# Patient Record
Sex: FEMALE | Race: WHITE
[De-identification: names, ages, dates, MRNs, and addresses within clinical notes are randomized per-mention and may not be internally consistent; named-entity substitution may affect disease eponyms.]

---

## 2017-09-15 ENCOUNTER — HOSPITAL ENCOUNTER (EMERGENCY)
Dept: HOSPITAL 62 - ER | Age: 32
Discharge: HOME | End: 2017-09-15
Payer: MEDICAID

## 2017-09-15 VITALS — DIASTOLIC BLOOD PRESSURE: 72 MMHG | SYSTOLIC BLOOD PRESSURE: 104 MMHG

## 2017-09-15 DIAGNOSIS — N30.00: Primary | ICD-10-CM

## 2017-09-15 DIAGNOSIS — R39.198: ICD-10-CM

## 2017-09-15 DIAGNOSIS — R07.9: ICD-10-CM

## 2017-09-15 DIAGNOSIS — F41.9: ICD-10-CM

## 2017-09-15 LAB
ALBUMIN SERPL-MCNC: 4.8 G/DL (ref 3.5–5)
ALP SERPL-CCNC: 53 U/L (ref 38–126)
ALT SERPL-CCNC: 24 U/L (ref 9–52)
ANION GAP SERPL CALC-SCNC: 13 MMOL/L (ref 5–19)
APPEARANCE UR: (no result)
AST SERPL-CCNC: 20 U/L (ref 14–36)
BASOPHILS # BLD AUTO: 0.1 10^3/UL (ref 0–0.2)
BASOPHILS NFR BLD AUTO: 1.1 % (ref 0–2)
BILIRUB DIRECT SERPL-MCNC: 0.4 MG/DL (ref 0–0.4)
BILIRUB SERPL-MCNC: 0.7 MG/DL (ref 0.2–1.3)
BILIRUB UR QL STRIP: NEGATIVE
BUN SERPL-MCNC: 11 MG/DL (ref 7–20)
CALCIUM: 9.6 MG/DL (ref 8.4–10.2)
CHLORIDE SERPL-SCNC: 105 MMOL/L (ref 98–107)
CK SERPL-CCNC: 204 U/L (ref 30–135)
CO2 SERPL-SCNC: 23 MMOL/L (ref 22–30)
CREAT SERPL-MCNC: 0.7 MG/DL (ref 0.52–1.25)
EOSINOPHIL # BLD AUTO: 0.3 10^3/UL (ref 0–0.6)
EOSINOPHIL NFR BLD AUTO: 3.8 % (ref 0–6)
ERYTHROCYTE [DISTWIDTH] IN BLOOD BY AUTOMATED COUNT: 13.2 % (ref 11.5–14)
GLUCOSE SERPL-MCNC: 87 MG/DL (ref 75–110)
GLUCOSE UR STRIP-MCNC: NEGATIVE MG/DL
HCT VFR BLD CALC: 39.2 % (ref 36–47)
HGB BLD-MCNC: 13.4 G/DL (ref 12–15.5)
HGB HCT DIFFERENCE: 1
KETONES UR STRIP-MCNC: NEGATIVE MG/DL
LIPASE SERPL-CCNC: 76.2 U/L (ref 23–300)
LYMPHOCYTES # BLD AUTO: 1.9 10^3/UL (ref 0.5–4.7)
LYMPHOCYTES NFR BLD AUTO: 27.6 % (ref 13–45)
MCH RBC QN AUTO: 29.4 PG (ref 27–33.4)
MCHC RBC AUTO-ENTMCNC: 34.3 G/DL (ref 32–36)
MCV RBC AUTO: 86 FL (ref 80–97)
MONOCYTES # BLD AUTO: 0.6 10^3/UL (ref 0.1–1.4)
MONOCYTES NFR BLD AUTO: 9.2 % (ref 3–13)
NEUTROPHILS # BLD AUTO: 4 10^3/UL (ref 1.7–8.2)
NEUTS SEG NFR BLD AUTO: 58.3 % (ref 42–78)
NITRITE UR QL STRIP: POSITIVE
PH UR STRIP: 7 [PH] (ref 5–9)
POTASSIUM SERPL-SCNC: 4.1 MMOL/L (ref 3.6–5)
PROT SERPL-MCNC: 7.9 G/DL (ref 6.3–8.2)
PROT UR STRIP-MCNC: NEGATIVE MG/DL
RBC # BLD AUTO: 4.57 10^6/UL (ref 3.72–5.28)
SODIUM SERPL-SCNC: 141 MMOL/L (ref 137–145)
SP GR UR STRIP: 1.02
UROBILINOGEN UR-MCNC: 2 MG/DL (ref ?–2)
WBC # BLD AUTO: 6.9 10^3/UL (ref 4–10.5)

## 2017-09-15 PROCEDURE — 80053 COMPREHEN METABOLIC PANEL: CPT

## 2017-09-15 PROCEDURE — 71020: CPT

## 2017-09-15 PROCEDURE — 99284 EMERGENCY DEPT VISIT MOD MDM: CPT

## 2017-09-15 PROCEDURE — 87086 URINE CULTURE/COLONY COUNT: CPT

## 2017-09-15 PROCEDURE — 84703 CHORIONIC GONADOTROPIN ASSAY: CPT

## 2017-09-15 PROCEDURE — 85025 COMPLETE CBC W/AUTO DIFF WBC: CPT

## 2017-09-15 PROCEDURE — 93010 ELECTROCARDIOGRAM REPORT: CPT

## 2017-09-15 PROCEDURE — 82553 CREATINE MB FRACTION: CPT

## 2017-09-15 PROCEDURE — 87088 URINE BACTERIA CULTURE: CPT

## 2017-09-15 PROCEDURE — 82550 ASSAY OF CK (CPK): CPT

## 2017-09-15 PROCEDURE — 81001 URINALYSIS AUTO W/SCOPE: CPT

## 2017-09-15 PROCEDURE — 36415 COLL VENOUS BLD VENIPUNCTURE: CPT

## 2017-09-15 PROCEDURE — 93005 ELECTROCARDIOGRAM TRACING: CPT

## 2017-09-15 PROCEDURE — 83690 ASSAY OF LIPASE: CPT

## 2017-09-15 PROCEDURE — 87186 SC STD MICRODIL/AGAR DIL: CPT

## 2017-09-15 NOTE — RADIOLOGY REPORT (SQ)
EXAM DESCRIPTION:  CHEST PA/LAT



COMPLETED DATE/TIME:  9/15/2017 9:54 am



REASON FOR STUDY:  cp



COMPARISON:  2/18/2016



EXAM PARAMETERS:  NUMBER OF VIEWS: two views

TECHNIQUE: Digital Frontal and Lateral radiographic views of the chest acquired.

RADIATION DOSE: NA

LIMITATIONS: none



FINDINGS:  LUNGS AND PLEURA: No opacities, masses or pneumothorax. No pleural effusion.

MEDIASTINUM AND HILAR STRUCTURES: No masses or contour abnormalities.

HEART AND VASCULAR STRUCTURES: Heart normal size.  No evidence for failure.

BONES: No acute findings.

HARDWARE: None in the chest.

OTHER: No other significant finding.



IMPRESSION:  NO SIGNIFICANT RADIOGRAPHIC FINDING IN THE CHEST.



TECHNICAL DOCUMENTATION:  JOB ID:  [id number]

 2011 [employer]- All Rights Reserved

## 2017-09-15 NOTE — ER DOCUMENT REPORT
ED General





- General


Chief Complaint: Urinary Problem


Stated Complaint: URINARY SYMPTOMS


Time Seen by Provider: 09/15/17 09:18


Mode of Arrival: Ambulatory


Information source: Patient


Notes: 





32-year-old female history of anxiety presents with complaints of chest pain as 

well as urinary symptoms.  Patient notes the chest pain has been ongoing now 

for 6 years happens intermittently


pt also ntoed urinary symptoms of 1 month duration , denies any fevers or 

chills. 


TRAVEL OUTSIDE OF THE [place] IN LAST 30 DAYS: No





- HPI


Onset: Other


Onset/Duration: Intermittent


Quality of pain: Achy


Severity: Mild


Pain Level: 1


Associated symptoms: Chest pain, Other


Exacerbated by: Other - Anxiety stress


Relieved by: Denies


Similar symptoms previously: Yes


Recently seen / treated by doctor: Yes





- Related Data


Allergies/Adverse Reactions: 


 





latex [Latex] Allergy (Mild, Verified 09/15/17 09:16)


 IRRITATION


SOME FRUITS Allergy (Severe, Uncoded 09/15/17 09:16)


 THROAT SWELLS


AVACADO Allergy (Uncoded 09/15/17 09:16)


 THROAT SWELLS


SOME DAIRY PRODUCTS Allergy (Uncoded 09/15/17 09:16)


 THROAT SWELLING











Past Medical History





- General


Information source: Patient





- Social History


Smoking Status: Never Smoker


Cigarette use (# per day): No


Chew tobacco use (# tins/day): No


Smoking Education Provided: No


Family History: Reviewed & Not Pertinent





- Past Medical History


Cardiac Medical History: 


   Denies: Hx Coronary Artery Disease, Hx Heart Attack, Hx Hypertension


Pulmonary Medical History: Reports: Hx Asthma


   Denies: Hx Bronchitis, Hx COPD, Hx Pneumonia


Neurological Medical History: Denies: Hx Cerebrovascular Accident, Hx Seizures


Renal/ Medical History: Denies: Hx Peritoneal Dialysis


Musculoskeltal Medical History: Denies Hx Arthritis


Past Surgical History: Reports: Hx Tubal Ligation





- Immunizations


Hx Diphtheria, Pertussis, Tetanus Vaccination: No





Review of Systems





- Review of Systems


Notes: 





REVIEW OF SYSTEMS:


CONSTITUTIONAL :  Denies fever,  chills, or sweats.  Denies recent illness.


EENT:   Denies eye, ear, throat, or mouth pain or symptoms.  Denies nasal or 

sinus congestion or discharge.  Denies throat, tongue, or mouth swelling or 

difficulty swallowing.


CARDIOVASCULAR: Admits to chest pain


RESPIRATORY:  Denies cough, cold, or chest congestion.  Denies shortness of 

breath, difficulty breathing, or wheezing.


GASTROINTESTINAL:  Denies abdominal pain or distention.  Denies nausea, vomiting

, or diarrhea.  Denies blood in vomitus, stools, or per rectum.  Denies black, 

tarry stools.  Denies constipation. 


GENITOURINARY:  admits to burning on urination 


FEMALE  GENITOURINARY:  Denies vaginal bleeding, heavy or abnormal periods, 

irregular periods.  Denies vaginal discharge or odor. 


MUSCULOSKELETAL:  Denies back or neck pain or stiffness.  Denies joint pain or 

swelling.


SKIN:   Denies rash, lesions or sores.


HEMATOLOGIC :   Denies easy bruising or bleeding.


LYMPHATIC:  Denies swollen, enlarged glands.


NEUROLOGICAL:  Denies confusion or altered mental status.  Denies passing out 

or loss of consciousness.  Denies dizziness or lightheadedness.  Denies 

headache.  Denies weakness or paralysis or loss of use of either side.  Denies 

problems with gait or speech.  Denies sensory loss, numbness, or tingling.  

Denies seizures.


PSYCHIATRIC: Admits to anxiety stress





ALL OTHER SYSTEMS REVIEWED AND NEGATIVE.











PHYSICAL EXAMINATION:





GENERAL: Well-appearing, well-nourished and in no acute distress.





HEAD: Atraumatic, normocephalic.





EYES: Pupils equal round and reactive to light, extraocular movements intact, 

conjunctiva are normal.





ENT: Nares patent, oropharynx clear without exudates.  Moist mucous membranes.





NECK: Normal range of motion, supple without lymphadenopathy





LUNGS: Breath sounds clear to auscultation bilaterally and equal.  No wheezes 

rales or rhonchi.





HEART: Regular rate and rhythm without murmurs





ABDOMEN: Soft, nontender, nondistended abdomen.  No guarding, no rebound.  No 

masses appreciated.





Female : deferred





Musculoskeletal: Normal range of motion, no pitting or edema.  No cyanosis.





NEUROLOGICAL: Cranial nerves grossly intact.  Normal speech, normal gait.  

Normal sensory, motor exams





PSYCH: Anxious





SKIN: Warm, Dry, normal turgor, no rashes or lesions noted.

















Dictation was performed using Dragon voice recognition software





Physical Exam





- Vital signs


Vitals: 


 











Temp Pulse Resp BP Pulse Ox


 


 98.5 F   70   18   117/69   100 


 


 09/15/17 09:15  09/15/17 09:15  09/15/17 09:15  09/15/17 09:15  09/15/17 09:15














Course





- Re-evaluation


Re-evalutation: 





09/15/17 10:47


Physical examination noted no significant abnormality except for the patient's 

obvious anxiety, she was clenching her hand throughout the whole conversation 

very nervous.  I explained to her at this time there is no obvious life-

threatening issues noted, patient does not fact have positive nitrates will be 

treated for urinary tract infection cardiac enzymes are pending at this time


09/15/17 15:27


Cardiac enzymes otherwise were negative exam was benign patient will be given 

follow-up with cardiology and strict return precautions











After performing a Medical Screening Examination, I estimate there is LOW risk 

for RUPTURED ESOPHAGUS, PNEUMOTHORAX, PULMONARY EMBOLISM, ACUTE CORONARY 

SYNDROME, OR THORACIC AORTIC DISSECTION, thus I consider the discharge 

disposition reasonable.  I have reevaluated this patient multiple times and no 

significant life threatening changes are noted. The patient and I have 

discussed the diagnosis and risks, and we agree with discharging home with 

close follow-up. We also discussed returning to the Emergency Department 

immediately if new or worsening symptoms occur. We have discussed the symptoms 

which are most concerning (e.g., bloody sputum, worsening pain or shortness of 

breath) that necessitate immediate return.





- Vital Signs


Vital signs: 


 











Temp Pulse Resp BP Pulse Ox


 


 97.9 F   70   23 H  104/72   99 


 


 09/15/17 11:26  09/15/17 09:15  09/15/17 11:23  09/15/17 11:23  09/15/17 11:23














- Laboratory


Result Diagrams: 


 09/15/17 09:35





 09/15/17 09:35


Laboratory results interpreted by me: 


 











  09/15/17 09/15/17





  09:15 09:35


 


Creatine Kinase   204 H


 


Urine Nitrite  POSITIVE H 


 


Urine Urobilinogen  2.0 H 


 


Ur Leukocyte Esterase  TRACE H 














- EKG Interpretation by Me


EKG shows normal: Sinus rhythm, Axis, Intervals, QRS Complexes





Discharge





- Discharge


Clinical Impression: 


 Anxiety





UTI (urinary tract infection)


Qualifiers:


 Urinary tract infection type: acute cystitis Hematuria presence: without 

hematuria Qualified Code(s): N30.00 - Acute cystitis without hematuria





Condition: Stable


Disposition: HOME, SELF-CARE


Instructions:  Anxiety ([facility]), Urinary Tract Infection ([facility])


Prescriptions: 


Cephalexin Monohydrate [Keflex 500 mg Capsule] 500 mg PO BID 5 Days  capsule


Referrals: 


LUIS CARR MD [ACTIVE STAFF] - Follow up tomorrow

## 2017-09-15 NOTE — ER DOCUMENT REPORT
ED Medical Screen (RME)





- General


Chief Complaint: Urinary Problem


Stated Complaint: CHEST PAIN


Time Seen by Provider: 09/15/17 09:18


Mode of Arrival: Ambulatory


Information source: Patient


TRAVEL OUTSIDE OF THE [place] IN LAST 30 DAYS: No





- HPI


Patient complains to provider of: Chest pain, dysuria


Notes: 





09/15/17 09:26


Patient is a 32-year-old female who presents to the emergency room today 

complaining of dysuria that has been going on for the past month with urinary 

frequency, and chest pain is been going on for the past 2 months and 

occasionally radiates to her neck





- Related Data


Allergies/Adverse Reactions: 


 





latex [Latex] Allergy (Mild, Verified 09/15/17 09:16)


 IRRITATION


SOME FRUITS Allergy (Severe, Uncoded 09/15/17 09:16)


 THROAT SWELLS


AVACADO Allergy (Uncoded 09/15/17 09:16)


 THROAT SWELLS


SOME DAIRY PRODUCTS Allergy (Uncoded 09/15/17 09:16)


 THROAT SWELLING











Past Medical History





- Past Medical History


Cardiac Medical History: 


   Denies: Hx Coronary Artery Disease, Hx Heart Attack, Hx Hypertension


Pulmonary Medical History: Reports: Hx Asthma


   Denies: Hx Bronchitis, Hx COPD, Hx Pneumonia


Neurological Medical History: Denies: Hx Cerebrovascular Accident, Hx Seizures


Renal/ Medical History: Denies: Hx Peritoneal Dialysis


Musculoskeltal Medical History: Denies Hx Arthritis


Past Surgical History: Reports: Hx Tubal Ligation





- Immunizations


Hx Diphtheria, Pertussis, Tetanus Vaccination: No





Physical Exam





- Vital signs


Vitals: 





 











Temp Pulse Resp BP Pulse Ox


 


 98.5 F   70   18   117/69   100 


 


 09/15/17 09:15  09/15/17 09:15  09/15/17 09:15  09/15/17 09:15  09/15/17 09:15














Course





- Vital Signs


Vital signs: 





 











Temp Pulse Resp BP Pulse Ox


 


 98.5 F   70   18   117/69   100 


 


 09/15/17 09:15  09/15/17 09:15  09/15/17 09:15  09/15/17 09:15  09/15/17 09:15

## 2019-04-20 ENCOUNTER — HOSPITAL ENCOUNTER (EMERGENCY)
Dept: HOSPITAL 62 - ER | Age: 34
Discharge: HOME | End: 2019-04-20
Payer: SELF-PAY

## 2019-04-20 VITALS — SYSTOLIC BLOOD PRESSURE: 118 MMHG | DIASTOLIC BLOOD PRESSURE: 78 MMHG

## 2019-04-20 DIAGNOSIS — Z91.018: ICD-10-CM

## 2019-04-20 DIAGNOSIS — R11.0: ICD-10-CM

## 2019-04-20 DIAGNOSIS — M79.602: ICD-10-CM

## 2019-04-20 DIAGNOSIS — Z87.19: ICD-10-CM

## 2019-04-20 DIAGNOSIS — Z98.890: ICD-10-CM

## 2019-04-20 DIAGNOSIS — Z91.040: ICD-10-CM

## 2019-04-20 DIAGNOSIS — R07.89: Primary | ICD-10-CM

## 2019-04-20 DIAGNOSIS — J45.909: ICD-10-CM

## 2019-04-20 LAB
ADD MANUAL DIFF: NO
ALBUMIN SERPL-MCNC: 4.5 G/DL (ref 3.5–5)
ALP SERPL-CCNC: 62 U/L (ref 38–126)
ALT SERPL-CCNC: 28 U/L (ref 9–52)
ANION GAP SERPL CALC-SCNC: 11 MMOL/L (ref 5–19)
APPEARANCE UR: CLEAR
APTT PPP: (no result) S
AST SERPL-CCNC: 22 U/L (ref 14–36)
BASOPHILS # BLD AUTO: 0.1 10^3/UL (ref 0–0.2)
BASOPHILS NFR BLD AUTO: 1.1 % (ref 0–2)
BILIRUB DIRECT SERPL-MCNC: 0.2 MG/DL (ref 0–0.4)
BILIRUB SERPL-MCNC: 0.3 MG/DL (ref 0.2–1.3)
BILIRUB UR QL STRIP: NEGATIVE
BUN SERPL-MCNC: 9 MG/DL (ref 7–20)
CALCIUM: 10.2 MG/DL (ref 8.4–10.2)
CHLORIDE SERPL-SCNC: 106 MMOL/L (ref 98–107)
CO2 SERPL-SCNC: 24 MMOL/L (ref 22–30)
EOSINOPHIL # BLD AUTO: 0.4 10^3/UL (ref 0–0.6)
EOSINOPHIL NFR BLD AUTO: 4.7 % (ref 0–6)
ERYTHROCYTE [DISTWIDTH] IN BLOOD BY AUTOMATED COUNT: 14.2 % (ref 11.5–14)
GLUCOSE SERPL-MCNC: 89 MG/DL (ref 75–110)
GLUCOSE UR STRIP-MCNC: NEGATIVE MG/DL
HCT VFR BLD CALC: 37.5 % (ref 36–47)
HGB BLD-MCNC: 12.7 G/DL (ref 12–15.5)
KETONES UR STRIP-MCNC: NEGATIVE MG/DL
LIPASE SERPL-CCNC: 100.5 U/L (ref 23–300)
LYMPHOCYTES # BLD AUTO: 2.5 10^3/UL (ref 0.5–4.7)
LYMPHOCYTES NFR BLD AUTO: 32.4 % (ref 13–45)
MCH RBC QN AUTO: 27.4 PG (ref 27–33.4)
MCHC RBC AUTO-ENTMCNC: 33.9 G/DL (ref 32–36)
MCV RBC AUTO: 81 FL (ref 80–97)
MONOCYTES # BLD AUTO: 0.6 10^3/UL (ref 0.1–1.4)
MONOCYTES NFR BLD AUTO: 8 % (ref 3–13)
NEUTROPHILS # BLD AUTO: 4.2 10^3/UL (ref 1.7–8.2)
NEUTS SEG NFR BLD AUTO: 53.8 % (ref 42–78)
NITRITE UR QL STRIP: NEGATIVE
PH UR STRIP: 7 [PH] (ref 5–9)
PLATELET # BLD: 275 10^3/UL (ref 150–450)
POTASSIUM SERPL-SCNC: 3.7 MMOL/L (ref 3.6–5)
PROT SERPL-MCNC: 8.1 G/DL (ref 6.3–8.2)
PROT UR STRIP-MCNC: NEGATIVE MG/DL
RBC # BLD AUTO: 4.63 10^6/UL (ref 3.72–5.28)
SODIUM SERPL-SCNC: 140.7 MMOL/L (ref 137–145)
SP GR UR STRIP: 1.01
TOTAL CELLS COUNTED % (AUTO): 100 %
UROBILINOGEN UR-MCNC: NEGATIVE MG/DL (ref ?–2)
WBC # BLD AUTO: 7.8 10^3/UL (ref 4–10.5)

## 2019-04-20 PROCEDURE — 93005 ELECTROCARDIOGRAM TRACING: CPT

## 2019-04-20 PROCEDURE — 99285 EMERGENCY DEPT VISIT HI MDM: CPT

## 2019-04-20 PROCEDURE — 81001 URINALYSIS AUTO W/SCOPE: CPT

## 2019-04-20 PROCEDURE — 93010 ELECTROCARDIOGRAM REPORT: CPT

## 2019-04-20 PROCEDURE — 80053 COMPREHEN METABOLIC PANEL: CPT

## 2019-04-20 PROCEDURE — 83690 ASSAY OF LIPASE: CPT

## 2019-04-20 PROCEDURE — 36415 COLL VENOUS BLD VENIPUNCTURE: CPT

## 2019-04-20 PROCEDURE — 85025 COMPLETE CBC W/AUTO DIFF WBC: CPT

## 2019-04-20 PROCEDURE — 84484 ASSAY OF TROPONIN QUANT: CPT

## 2019-04-20 PROCEDURE — 71046 X-RAY EXAM CHEST 2 VIEWS: CPT

## 2019-04-20 NOTE — RADIOLOGY REPORT (SQ)
EXAM DESCRIPTION: 



XR CHEST 2 VIEWS



COMPLETED DATE/TME:  04/20/2019 19:49



CLINICAL HISTORY:  33 years  Female  Chest pain radiated to left

arm and jaw 



COMPARISON:  9/15/2017



FINDINGS: 



The cardiomediastinal silhouette appears unremarkable.

No consolidating infiltrates or pleural effusions.

No pneumothorax. 







IMPRESSION: 



No acute abnormality is identified.

## 2019-04-20 NOTE — ER DOCUMENT REPORT
ED General





- General


Chief Complaint: Chest Pain > 30


Stated Complaint: CHEST PAIN,LEFT ARM PAIN


Time Seen by Provider: 19 19:48


Primary Care Provider: 


IMANI OLVERA MD [Primary Care Provider] - Follow up as needed


Mode of Arrival: Ambulatory


Information source: Patient, [facility] Records


Notes: 





33-year-old female presented to ED for complaint of substernal chest pain 

squeezing that radiated to the left arm and jaw.  She states the pain to the 

left arm and jaw were only for a few seconds she also had nausea for a few very 

brief period.  She states the left chest squeezing has continued but is getting 

better.  Patient has a history of esophageal strictures with endoscopy and 

dilatation colitis reflux anxiety and panic attacks she is also had a colonosco

py recently.  She had a bilateral tubal ligation.  She states she does not smoke

and drinks maybe once a month or less.  Works at a  and lives with a 

significant other and her children.  Patient denies recent illness, family 

history of early cardiac death.


TRAVEL OUTSIDE OF THE [place] IN LAST 30 DAYS: No





- HPI


Onset: Just prior to arrival


Onset/Duration: Sudden


Quality of pain: Other - Squeezing


Severity: Mild


Associated symptoms: Chest pain.  denies: Nonproductive cough, Productive cough,

Fever, Nausea, Vomiting, Shortness of breath, Sweating, Weakness


Exacerbated by: Denies


Relieved by: Denies


Similar symptoms previously: Yes


Recently seen / treated by doctor: Yes





- Related Data


Allergies/Adverse Reactions: 


                                        





latex [Latex] Allergy (Mild, Verified 09/15/17 09:16)


   IRRITATION


SOME FRUITS Allergy (Severe, Uncoded 09/15/17 09:16)


   THROAT SWELLS


AVACADO Allergy (Uncoded 09/15/17 09:16)


   THROAT SWELLS


SOME DAIRY PRODUCTS Allergy (Uncoded 09/15/17 09:16)


   THROAT SWELLING











Past Medical History





- General


Information source: Patient





- Social History


Smoking Status: Never Smoker


Chew tobacco use (# tins/day): No


Frequency of alcohol use: Occasional


Drug Abuse: None


Lives with: Family


Family History: Reviewed & Not Pertinent


Patient has suicidal ideation: No


Patient has homicidal ideation: No





- Past Medical History


Cardiac Medical History: 


   Denies: Hx Coronary Artery Disease, Hx Heart Attack, Hx Hypertension


Pulmonary Medical History: Reports: Hx Asthma


   Denies: Hx Bronchitis, Hx COPD, Hx Pneumonia


Neurological Medical History: Denies: Hx Cerebrovascular Accident, Hx Seizures


Renal/ Medical History: Denies: Hx Peritoneal Dialysis


Musculoskeletal Medical History: Denies Hx Arthritis


Past Surgical History: Reports: Hx  Section, Hx Tubal Ligation





- Immunizations


Hx Diphtheria, Pertussis, Tetanus Vaccination: No





Review of Systems





- Review of Systems


Notes: 





REVIEW OF SYSTEMS:


CONSTITUTIONAL :  Denies fever,  chills, or sweats.  Denies recent illness. 

Denies weight loss, recent hospitalizations. 


EENT: Denies visual changes, eye pain.  Denies sore throat, oral lesions, 

difficulty swallowing.


CARDIOVASCULAR:  + chest pain.  Denies palpitations. Denies lower extremity 

edema.


RESPIRATORY:  Denies cough. Denies shortness of breath, wheezing.


GASTROINTESTINAL:  Denies abdominal pain or distention.  Denies nausea, 

vomiting, or diarrhea.  Denies blood in vomitus, stools, or per rectum.  Denies 

black, tarry stools.  Denies constipation.  


GENITOURINARY:  Denies difficulty urinating, painful urination,  frequency, 

blood in urine, or  vaginal discharge.


MUSCULOSKELETAL:  Denies back or neck pain or stiffness.  Denies joint pain or 

swelling.


SKIN:   Denies rash, lesions or sores.


HEMATOLOGIC :   Denies easy bruising or bleeding.


LYMPHATIC:  Denies swollen glands.


NEUROLOGICAL:  Denies confusion or altered mental status.  Denies loss of 

consciousness.  Denies dizziness or lightheadedness.  Denies headache.  Denies 

weakness or paralysis.  Denies problems difficulty with ambulation, slurred 

speech.  Denies sensory loss, numbness, or tingling.  Denies seizures.


PSYCHIATRIC:  Denies anxiety or stress.  Denies depression, suicidal ideation, 

or homicidal ideation.  Denies visual or auditory hallucinations.








Physical Exam





- Vital signs


Vitals: 


                                        











Temp Pulse Resp BP Pulse Ox


 


 97.3 F   58 L  19   120/84   100 


 


 19 19:51  19 19:51  19 19:51  19 19:51  19 19:51














- Notes


Notes: 





PHYSICAL EXAMINATION:





GENERAL: Well-appearing, well-nourished and in no acute distress.





HEAD: Atraumatic, normocephalic.





EYES: Pupils equal round and reactive to light, extraocular movements intact, 

conjunctiva are normal.





ENT: Nares patent, oropharynx clear without exudates.  Moist mucous membranes.





NECK: Normal range of motion, supple without lymphadenopathy





LUNGS: Breath sounds clear to auscultation bilaterally and equal.  No wheezes 

rales or rhonchi.





HEART: Regular rate and rhythm without murmurs





ABDOMEN: Soft, nontender, nondistended abdomen.  No guarding, no rebound.  No 

masses appreciated.





Female : deferred





Musculoskeletal: Normal range of motion, no pitting or edema.  No cyanosis.





NEUROLOGICAL: Cranial nerves grossly intact.  Normal speech, normal gait.  

Normal sensory, motor exams





PSYCH: Normal mood, normal affect.





SKIN: Warm, Dry, normal turgor, no rashes or lesions noted.





Course





- Re-evaluation


Re-evalutation: 





19 15:05





Laboratory











  19





  20:09 20:09 20:09


 


WBC  7.8  


 


RBC  4.63  


 


Hgb  12.7  


 


Hct  37.5  


 


MCV  81  


 


MCH  27.4  


 


MCHC  33.9  


 


RDW  14.2 H  


 


Plt Count  275  


 


Seg Neutrophils %  53.8  


 


Lymphocytes %  32.4  


 


Monocytes %  8.0  


 


Eosinophils %  4.7  


 


Basophils %  1.1  


 


Absolute Neutrophils  4.2  


 


Absolute Lymphocytes  2.5  


 


Absolute Monocytes  0.6  


 


Absolute Eosinophils  0.4  


 


Absolute Basophils  0.1  


 


Sodium   140.7 


 


Potassium   3.7 


 


Chloride   106 


 


Carbon Dioxide   24 


 


Anion Gap   11 


 


BUN   9 


 


Creatinine   0.58 


 


Est GFR ( Amer)   > 60 


 


Est GFR (Non-Af Amer)   > 60 


 


Glucose   89 


 


Calcium   10.2 


 


Total Bilirubin   0.3 


 


Direct Bilirubin   0.2 


 


Neonat Total Bilirubin   Not Reportable 


 


Neonat Direct Bilirubin   Not Reportable 


 


Neonat Indirect Bili   Not Reportable 


 


AST   22 


 


ALT   28 


 


Alkaline Phosphatase   62 


 


Troponin I    < 0.012


 


Total Protein   8.1 


 


Albumin   4.5 


 


Lipase   100.5 


 


Urine Color   


 


Urine Appearance   


 


Urine pH   


 


Ur Specific Gravity   


 


Urine Protein   


 


Urine Glucose (UA)   


 


Urine Ketones   


 


Urine Blood   


 


Urine Nitrite   


 


Urine Bilirubin   


 


Urine Urobilinogen   


 


Ur Leukocyte Esterase   


 


Urine WBC (Auto)   


 


Urine RBC (Auto)   


 


Urine Bacteria (Auto)   


 


Squamous Epi Cells Auto   


 


Urine Mucus (Auto)   


 


Urine Ascorbic Acid   














  19





  20:09


 


WBC 


 


RBC 


 


Hgb 


 


Hct 


 


MCV 


 


MCH 


 


MCHC 


 


RDW 


 


Plt Count 


 


Seg Neutrophils % 


 


Lymphocytes % 


 


Monocytes % 


 


Eosinophils % 


 


Basophils % 


 


Absolute Neutrophils 


 


Absolute Lymphocytes 


 


Absolute Monocytes 


 


Absolute Eosinophils 


 


Absolute Basophils 


 


Sodium 


 


Potassium 


 


Chloride 


 


Carbon Dioxide 


 


Anion Gap 


 


BUN 


 


Creatinine 


 


Est GFR ( Amer) 


 


Est GFR (Non-Af Amer) 


 


Glucose 


 


Calcium 


 


Total Bilirubin 


 


Direct Bilirubin 


 


Neonat Total Bilirubin 


 


Neonat Direct Bilirubin 


 


Neonat Indirect Bili 


 


AST 


 


ALT 


 


Alkaline Phosphatase 


 


Troponin I 


 


Total Protein 


 


Albumin 


 


Lipase 


 


Urine Color  STRAW


 


Urine Appearance  CLEAR


 


Urine pH  7.0


 


Ur Specific Gravity  1.009


 


Urine Protein  NEGATIVE


 


Urine Glucose (UA)  NEGATIVE


 


Urine Ketones  NEGATIVE


 


Urine Blood  SMALL H


 


Urine Nitrite  NEGATIVE


 


Urine Bilirubin  NEGATIVE


 


Urine Urobilinogen  NEGATIVE


 


Ur Leukocyte Esterase  SMALL H


 


Urine WBC (Auto)  1


 


Urine RBC (Auto)  2


 


Urine Bacteria (Auto)  TRACE


 


Squamous Epi Cells Auto  3


 


Urine Mucus (Auto)  RARE


 


Urine Ascorbic Acid  NEGATIVE











                                        





Chest X-Ray  19 19:49


IMPRESSION: 


 


No acute abnormality is identified.


 











                                        











Temp Pulse Resp BP Pulse Ox


 


 97.8 F   59 L  16   118/78   100 


 


 19 21:56  19 21:56  19 21:56  19 21:56  19 21:56








33-year-old female with history of anxiety presents with chest tightness.  Vital

 signs reviewed and within normal limits upon arrival.  EKG was reviewed by 

myself and showed the patient to be in normal sinus rhythm without ST elevations

 or evidence of ischemia.  CBC, CMP, cardiac enzymes, urinalysis and chest x-ray

 are unremarkable.  Patient did receive a GI cocktail as she does have a history

 of esophageal spasms.  She does report improvement of pain.  Patient is PERC 

negative.








HEART Score:





History-0      


ECG-0      


Age-0      


Risk Factors-0


Troponin   





Total: 0





 If HEART score is = 3 AND both troponin measurements are normal, the 30 day 

risk of a major adverse cardiac event (all-cause mortality, myocardial infa

rction or need for coronary revascularization) is < 1% (Sensitivity 100%, NPV 

100%).








Chest pain in a patient without evidence of cardiac or other serious etiology on

 workup today. I discussed with patient that, based on their age, risk factors 

and emergency department testing today, the likelihood that their symptoms are 

related to a heart attack is very low (estimated risk of heart attack or death 

over the next 30 days of less than 1%).  The patient demonstrates decision 

making capacity and has verbalized an understanding of these risks to me. Based 

on this,  the patient has chosen to follow-up as an outpatient. Usual chest pain

 return precautions reviewed. The patient states understanding and agreement 

with this plan.








- Vital Signs


Vital signs: 


                                        











Temp Pulse Resp BP Pulse Ox


 


 97.8 F   59 L  16   118/78   100 


 


 19 21:56  19 21:56  19 21:56  19 21:56  19 21:56














- Laboratory


Result Diagrams: 


                                 19 20:09





                                 19 20:09


Laboratory results interpreted by me: 


                                        











  19





  20:09 20:09


 


RDW  14.2 H 


 


Urine Blood   SMALL H


 


Ur Leukocyte Esterase   SMALL H














- Diagnostic Test


Radiology reviewed: Image reviewed, Reports reviewed





- EKG Interpretation by Me


EKG shows normal: Sinus rhythm


Rate: Normal


Rhythm: NSR


When compared to previous EKG there are: No significant change





Discharge





- Discharge


Clinical Impression: 


Chest pain


Qualifiers:


 Chest pain type: unspecified Qualified Code(s): R07.9 - Chest pain, unspecified





Condition: Good


Disposition: HOME, SELF-CARE


Instructions:  Chest Pain of Unclear Cause ([facility])


Additional Instructions: 


You were seen today for chest pain.  The exact cause of your pain is unclear. 

However, based on your cardiac enzyme testing, chest x-ray, and EKG it does not 

appear that it is from an immediately life-threatening cause at this time.  

Although your testing here is normal is critical that you follow-up with your 

primary care physician for continued evaluation of this chest pain and possible 

stress testing.  I recommended you see your physician within the next 24-48 

hours to be evaluated for consideration of a stress test.  Please return to 

emergency department immediately if you have worsening of your chest pain, 

shortness of breath, vomiting, become unable to exert yourself due to pain or 

difficulty breathing, you pass out, or have any pain that radiates into your 

arms, jaw, or back. Please also return if you have any additional symptoms that 

are concerning to you.


Referrals: 


IMANI OLVERA MD [Primary Care Provider] - Follow up as needed

## 2019-04-20 NOTE — ER DOCUMENT REPORT
ED Medical Screen (RME)





- General


Chief Complaint: Chest Pain > 30


Stated Complaint: CHEST PAIN,LEFT ARM PAIN


Time Seen by Provider: 04/20/19 19:48


Mode of Arrival: Ambulatory


Information source: Patient


Notes: 





33-year-old female presented to ED for complaint of substernal chest pain 

squeezing that radiated to the left arm and jaw.  She states the pain to the 

left arm and jaw were only for a few seconds she also had nausea for a few very 

brief period.  She states the left chest squeezing has continued but is getting 

better.  Patient has a history of esophageal strictures with endoscopy and 

dilatation colitis reflux anxiety and panic attacks she is also had a 

colonoscopy recently.  She had a bilateral tubal ligation.  She states she does 

not smoke and drinks maybe once a month or less.  Works at a  and lives 

with a significant other and her children.  Patient is alert oriented 

respirations regular and unlabored speaking in full sentences.  Lung sounds are 

clear to auscultation








I have greeted and performed a rapid initial assessment of this patient.  A 

comprehensive ED assessment and evaluation of the patient, analysis of test 

results and completion of medical decision making process will be conducted by 

an additional ED providers.


TRAVEL OUTSIDE OF THE [place] IN LAST 30 DAYS: No





- Related Data


Allergies/Adverse Reactions: 


                                        





latex [Latex] Allergy (Mild, Verified 09/15/17 09:16)


   IRRITATION


SOME FRUITS Allergy (Severe, Uncoded 09/15/17 09:16)


   THROAT SWELLS


AVACADO Allergy (Uncoded 09/15/17 09:16)


   THROAT SWELLS


SOME DAIRY PRODUCTS Allergy (Uncoded 09/15/17 09:16)


   THROAT SWELLING











Past Medical History





- Past Medical History


Cardiac Medical History: 


   Denies: Hx Coronary Artery Disease, Hx Heart Attack, Hx Hypertension


Pulmonary Medical History: Reports: Hx Asthma


   Denies: Hx Bronchitis, Hx COPD, Hx Pneumonia


Neurological Medical History: Denies: Hx Cerebrovascular Accident, Hx Seizures


Renal/ Medical History: Denies: Hx Peritoneal Dialysis


Musculoskeltal Medical History: Denies Hx Arthritis


Past Surgical History: Reports: Hx Tubal Ligation





- Immunizations


Hx Diphtheria, Pertussis, Tetanus Vaccination: No